# Patient Record
Sex: FEMALE | Race: WHITE | NOT HISPANIC OR LATINO | Employment: OTHER | URBAN - METROPOLITAN AREA
[De-identification: names, ages, dates, MRNs, and addresses within clinical notes are randomized per-mention and may not be internally consistent; named-entity substitution may affect disease eponyms.]

---

## 2022-09-26 ENCOUNTER — APPOINTMENT (OUTPATIENT)
Dept: RADIOLOGY | Facility: MEDICAL CENTER | Age: 57
End: 2022-09-26
Attending: EMERGENCY MEDICINE
Payer: COMMERCIAL

## 2022-09-26 ENCOUNTER — HOSPITAL ENCOUNTER (EMERGENCY)
Facility: MEDICAL CENTER | Age: 57
End: 2022-09-27
Attending: EMERGENCY MEDICINE
Payer: COMMERCIAL

## 2022-09-26 DIAGNOSIS — G89.18 POSTOPERATIVE PAIN: ICD-10-CM

## 2022-09-26 DIAGNOSIS — Z86.73 HISTORY OF RECENT STROKE: ICD-10-CM

## 2022-09-26 DIAGNOSIS — M79.605 LEFT LEG PAIN: ICD-10-CM

## 2022-09-26 DIAGNOSIS — G89.29 OTHER CHRONIC PAIN: ICD-10-CM

## 2022-09-26 DIAGNOSIS — H53.9 VISUAL DISTURBANCE: ICD-10-CM

## 2022-09-26 DIAGNOSIS — Z91.199 HISTORY OF NONCOMPLIANCE WITH MEDICAL TREATMENT: ICD-10-CM

## 2022-09-26 LAB
ALBUMIN SERPL BCP-MCNC: 4.2 G/DL (ref 3.2–4.9)
ALBUMIN/GLOB SERPL: 1.1 G/DL
ALP SERPL-CCNC: 118 U/L (ref 30–99)
ALT SERPL-CCNC: 14 U/L (ref 2–50)
ANION GAP SERPL CALC-SCNC: 14 MMOL/L (ref 7–16)
AST SERPL-CCNC: 16 U/L (ref 12–45)
BASOPHILS # BLD AUTO: 0.3 % (ref 0–1.8)
BASOPHILS # BLD: 0.02 K/UL (ref 0–0.12)
BILIRUB SERPL-MCNC: 0.3 MG/DL (ref 0.1–1.5)
BUN SERPL-MCNC: 8 MG/DL (ref 8–22)
CALCIUM SERPL-MCNC: 9.2 MG/DL (ref 8.5–10.5)
CHLORIDE SERPL-SCNC: 98 MMOL/L (ref 96–112)
CO2 SERPL-SCNC: 22 MMOL/L (ref 20–33)
CREAT SERPL-MCNC: 0.66 MG/DL (ref 0.5–1.4)
EKG IMPRESSION: NORMAL
EOSINOPHIL # BLD AUTO: 0.06 K/UL (ref 0–0.51)
EOSINOPHIL NFR BLD: 0.9 % (ref 0–6.9)
ERYTHROCYTE [DISTWIDTH] IN BLOOD BY AUTOMATED COUNT: 38.6 FL (ref 35.9–50)
GFR SERPLBLD CREATININE-BSD FMLA CKD-EPI: 102 ML/MIN/1.73 M 2
GLOBULIN SER CALC-MCNC: 3.9 G/DL (ref 1.9–3.5)
GLUCOSE SERPL-MCNC: 416 MG/DL (ref 65–99)
HCT VFR BLD AUTO: 37.7 % (ref 37–47)
HGB BLD-MCNC: 12.4 G/DL (ref 12–16)
IMM GRANULOCYTES # BLD AUTO: 0.03 K/UL (ref 0–0.11)
IMM GRANULOCYTES NFR BLD AUTO: 0.4 % (ref 0–0.9)
LYMPHOCYTES # BLD AUTO: 1.49 K/UL (ref 1–4.8)
LYMPHOCYTES NFR BLD: 22.3 % (ref 22–41)
MCH RBC QN AUTO: 28.2 PG (ref 27–33)
MCHC RBC AUTO-ENTMCNC: 32.9 G/DL (ref 33.6–35)
MCV RBC AUTO: 85.9 FL (ref 81.4–97.8)
MONOCYTES # BLD AUTO: 0.4 K/UL (ref 0–0.85)
MONOCYTES NFR BLD AUTO: 6 % (ref 0–13.4)
NEUTROPHILS # BLD AUTO: 4.67 K/UL (ref 2–7.15)
NEUTROPHILS NFR BLD: 70.1 % (ref 44–72)
NRBC # BLD AUTO: 0 K/UL
NRBC BLD-RTO: 0 /100 WBC
PLATELET # BLD AUTO: 270 K/UL (ref 164–446)
PMV BLD AUTO: 11.1 FL (ref 9–12.9)
POTASSIUM SERPL-SCNC: 3.7 MMOL/L (ref 3.6–5.5)
PROT SERPL-MCNC: 8.1 G/DL (ref 6–8.2)
RBC # BLD AUTO: 4.39 M/UL (ref 4.2–5.4)
SODIUM SERPL-SCNC: 134 MMOL/L (ref 135–145)
WBC # BLD AUTO: 6.7 K/UL (ref 4.8–10.8)

## 2022-09-26 PROCEDURE — 700102 HCHG RX REV CODE 250 W/ 637 OVERRIDE(OP): Performed by: EMERGENCY MEDICINE

## 2022-09-26 PROCEDURE — 93005 ELECTROCARDIOGRAM TRACING: CPT | Performed by: EMERGENCY MEDICINE

## 2022-09-26 PROCEDURE — 82962 GLUCOSE BLOOD TEST: CPT

## 2022-09-26 PROCEDURE — 93926 LOWER EXTREMITY STUDY: CPT | Mod: LT

## 2022-09-26 PROCEDURE — A9270 NON-COVERED ITEM OR SERVICE: HCPCS | Performed by: EMERGENCY MEDICINE

## 2022-09-26 PROCEDURE — 93922 UPR/L XTREMITY ART 2 LEVELS: CPT

## 2022-09-26 PROCEDURE — 80053 COMPREHEN METABOLIC PANEL: CPT

## 2022-09-26 PROCEDURE — 99284 EMERGENCY DEPT VISIT MOD MDM: CPT

## 2022-09-26 PROCEDURE — 36415 COLL VENOUS BLD VENIPUNCTURE: CPT

## 2022-09-26 PROCEDURE — 85025 COMPLETE CBC W/AUTO DIFF WBC: CPT

## 2022-09-26 RX ORDER — HYDROCODONE BITARTRATE AND ACETAMINOPHEN 5; 325 MG/1; MG/1
1 TABLET ORAL ONCE
Status: COMPLETED | OUTPATIENT
Start: 2022-09-26 | End: 2022-09-26

## 2022-09-26 RX ORDER — HYDROCODONE BITARTRATE AND ACETAMINOPHEN 5; 325 MG/1; MG/1
1 TABLET ORAL ONCE
Status: COMPLETED | OUTPATIENT
Start: 2022-09-27 | End: 2022-09-26

## 2022-09-26 RX ORDER — CARVEDILOL 6.25 MG/1
12.5 TABLET ORAL ONCE
Status: COMPLETED | OUTPATIENT
Start: 2022-09-27 | End: 2022-09-26

## 2022-09-26 RX ORDER — CLOPIDOGREL BISULFATE 75 MG/1
75 TABLET ORAL ONCE
Status: COMPLETED | OUTPATIENT
Start: 2022-09-26 | End: 2022-09-26

## 2022-09-26 RX ORDER — HYDROCODONE BITARTRATE AND ACETAMINOPHEN 5; 325 MG/1; MG/1
1 TABLET ORAL EVERY 6 HOURS PRN
Qty: 12 TABLET | Refills: 0 | Status: SHIPPED | OUTPATIENT
Start: 2022-09-26 | End: 2022-09-29

## 2022-09-26 RX ADMIN — HYDROCODONE BITARTRATE AND ACETAMINOPHEN 1 TABLET: 5; 325 TABLET ORAL at 17:58

## 2022-09-26 RX ADMIN — CARVEDILOL 12.5 MG: 6.25 TABLET, FILM COATED ORAL at 23:50

## 2022-09-26 RX ADMIN — HYDROCODONE BITARTRATE AND ACETAMINOPHEN 1 TABLET: 5; 325 TABLET ORAL at 23:50

## 2022-09-26 RX ADMIN — CLOPIDOGREL BISULFATE 75 MG: 75 TABLET ORAL at 22:37

## 2022-09-26 NOTE — ED TRIAGE NOTES
Pt amb to triage w/ personal walker.  Chief Complaint   Patient presents with    Vision Change     Rt eye     Pt reports she has not been able to see out of her rt eye since her knee surgery 6d ago.

## 2022-09-27 VITALS
RESPIRATION RATE: 16 BRPM | TEMPERATURE: 98 F | DIASTOLIC BLOOD PRESSURE: 90 MMHG | BODY MASS INDEX: 30.02 KG/M2 | WEIGHT: 163.14 LBS | HEART RATE: 80 BPM | SYSTOLIC BLOOD PRESSURE: 207 MMHG | OXYGEN SATURATION: 95 % | HEIGHT: 62 IN

## 2022-09-27 LAB — GLUCOSE BLD STRIP.AUTO-MCNC: 269 MG/DL (ref 65–99)

## 2022-09-27 NOTE — ED NOTES
Pt is discharged. Paperwork explained and all questions answered. Prescription  education provided.  Narcotics agreement form explained and signed. All belongings sent with pt upon departure. Pt ambulatory with a steady gait out of ED.  Pt calling a taxi for ride home.

## 2022-09-27 NOTE — DISCHARGE INSTRUCTIONS
Call the vascular surgeon Dr. Cruz tomorrow to be seen in the next day or 2.  Return to the emergency department if you have increasing pain, if you have pain in your foot, numbness in your foot or Move your foot or for other concerns.  Your visual problems are from your recent stroke.  It is very important you take your medications including your blood pressure medications and Plavix and cholesterol medications.  Take Norco for pain.  Return for any new medical problems or complaints.  Follow-up with primary care and the vascular surgeon.

## 2022-09-27 NOTE — ED PROVIDER NOTES
"ED Provider Note    Scribed for Rodrick Naqvi M.D. by Laura Sainz. 9/26/2022, 5:39 PM.    Primary care provider: No primary care provider noted.   Means of arrival: Walk-in  History obtained from: Patient   History limited by: None    CHIEF COMPLAINT  Chief Complaint   Patient presents with    Vision Change     Rt eye       HPI  Jessica Schneider is a 57 y.o. female who presents to the Emergency Department for evaluation of left knee pain onset 6 days ago. Patient states that she had left leg surgery 6 days ago to \"remove a vein\". She states that \"some crazy doc did it\" at [place]. She notes that she did not receive any pain medication upon discharge and was told to come [facility] some. Patient also reports vision changes since her surgery. She reports that she is unable to see towards the right side out of both eyes. She adds a history of diabetes and hypertension. She admits to associated symptoms of left leg pain, and bilateral blurry vision (more on right), but denies eye pain, head trauma, or neck pain. No alleviating factors were reported. Patient reports she does not smoke and is a recovering alcoholic.  Denies any falls or trauma.  Denies any other acute concerns or complaints.  No other focal numbness tingling or weakness.  The patient also states that her chronic pain doctor will not refill her prescription for Norco.  She has been on Norco for 2 years for pain.  She has mostly in her back but it is all over her body.  She is very upset about this.  She has not taken her medicine that she was prescribed during her recent hospitalization.  Cannot tell me any details about the name of the hospital, location of the hospital or the doctors what her diagnosis occasions.    REVIEW OF SYSTEMS  ROS  See HPI above. All other systems negative.     PAST MEDICAL HISTORY   has a past medical history of Hypertension.    SURGICAL HISTORY   has a past surgical history that includes other orthopedic " "surgery.    SOCIAL HISTORY  Social History     Tobacco Use    Smoking status: Former     Types: Cigarettes   Vaping Use    Vaping Use: Never used   Substance Use Topics    Alcohol use: Not Currently    Drug use: Not Currently      Social History     Substance and Sexual Activity   Drug Use Not Currently       FAMILY HISTORY  History reviewed. No pertinent family history.    CURRENT MEDICATIONS  Home Medications       Reviewed by Benson Schwartz R.N. (Registered Nurse) on 09/26/22 at 1404  Med List Status: Partial     Medication Last Dose Status   HYDROCODONE-ACETAMINOPHEN PO  Active   NON SPECIFIED  Active                    ALLERGIES  Allergies   Allergen Reactions    Aspirin      Rxn=seizure       PHYSICAL EXAM  VITAL SIGNS: BP (!) 200/99   Pulse 97   Temp 36.3 °C (97.3 °F) (Oral)   Resp 18   Ht 1.575 m (5' 2\")   Wt 74 kg (163 lb 2.3 oz)   SpO2 100%   BMI 29.84 kg/m²   Vitals reviewed.  Constitutional: Well developed, Well nourished, No acute distress, Non-toxic appearance.  Tearful complaining of leg pain.  HENT: Normocephalic, Atraumatic, Bilateral external ears normal, Oropharynx moist, No oral exudates, Nose normal.   Eyes: PERRL, EOMI, Conjunctiva normal, No discharge.  She has a right lateral home homonymous hemianopsia  Neck: Normal range of motion, No tenderness, Supple, No stridor.   Cardiovascular: Normal heart rate, Normal rhythm, No murmurs, No rubs, No gallops.   Thorax & Lungs: Normal breath sounds, No respiratory distress, No wheezing, No chest tenderness.   Abdomen: Bowel sounds normal, Soft, No tenderness  Skin: Warm, Dry, No erythema, No rash.   Back: No tenderness, No CVA tenderness.   Musculoskeletal: Good range of motion in all major joints.  Left leg has a surgical incision below the knee on the medial aspect with sutures intact.  Normal neurovascular exam.  Neurologic: Cranial nerves II through XII are intact other than her visual field deficit above.  Normal finger-to-nose.  No " focal weakness.  Psychiatric: Affect normal    LABS  Results for orders placed or performed during the hospital encounter of 09/26/22   CBC WITH DIFFERENTIAL   Result Value Ref Range    WBC 6.7 4.8 - 10.8 K/uL    RBC 4.39 4.20 - 5.40 M/uL    Hemoglobin 12.4 12.0 - 16.0 g/dL    Hematocrit 37.7 37.0 - 47.0 %    MCV 85.9 81.4 - 97.8 fL    MCH 28.2 27.0 - 33.0 pg    MCHC 32.9 (L) 33.6 - 35.0 g/dL    RDW 38.6 35.9 - 50.0 fL    Platelet Count 270 164 - 446 K/uL    MPV 11.1 9.0 - 12.9 fL    Neutrophils-Polys 70.10 44.00 - 72.00 %    Lymphocytes 22.30 22.00 - 41.00 %    Monocytes 6.00 0.00 - 13.40 %    Eosinophils 0.90 0.00 - 6.90 %    Basophils 0.30 0.00 - 1.80 %    Immature Granulocytes 0.40 0.00 - 0.90 %    Nucleated RBC 0.00 /100 WBC    Neutrophils (Absolute) 4.67 2.00 - 7.15 K/uL    Lymphs (Absolute) 1.49 1.00 - 4.80 K/uL    Monos (Absolute) 0.40 0.00 - 0.85 K/uL    Eos (Absolute) 0.06 0.00 - 0.51 K/uL    Baso (Absolute) 0.02 0.00 - 0.12 K/uL    Immature Granulocytes (abs) 0.03 0.00 - 0.11 K/uL    NRBC (Absolute) 0.00 K/uL   COMP METABOLIC PANEL   Result Value Ref Range    Sodium 134 (L) 135 - 145 mmol/L    Potassium 3.7 3.6 - 5.5 mmol/L    Chloride 98 96 - 112 mmol/L    Co2 22 20 - 33 mmol/L    Anion Gap 14.0 7.0 - 16.0    Glucose 416 (H) 65 - 99 mg/dL    Bun 8 8 - 22 mg/dL    Creatinine 0.66 0.50 - 1.40 mg/dL    Calcium 9.2 8.5 - 10.5 mg/dL    AST(SGOT) 16 12 - 45 U/L    ALT(SGPT) 14 2 - 50 U/L    Alkaline Phosphatase 118 (H) 30 - 99 U/L    Total Bilirubin 0.3 0.1 - 1.5 mg/dL    Albumin 4.2 3.2 - 4.9 g/dL    Total Protein 8.1 6.0 - 8.2 g/dL    Globulin 3.9 (H) 1.9 - 3.5 g/dL    A-G Ratio 1.1 g/dL   ESTIMATED GFR   Result Value Ref Range    GFR (CKD-EPI) 102 >60 mL/min/1.73 m 2   EKG (NOW)   Result Value Ref Range    Report       [facility] Emergency Dept.    Test Date:  2022-09-26  Pt Name:    ORTIZ MURRAY                 Department: ER  MRN:        [id number]                      Room:         65  Gender:     Female                       Technician: [id number]  :        1965                   Requested By:KIMBERLEY CHILD  Order #:    [id number]                    Reading MD: KIMBERLEY CHILD. AMD    Measurements  Intervals                                Axis  Rate:       93                           P:          74  NE:         180                          QRS:        85  QRSD:       71                           T:          57  QT:         364  QTc:        453    Interpretive Statements  Sinus rhythm  No previous ECG available for comparison  Electronically Signed On 2022 23:57:09 PDT by KIMBERLEY CIHLD. AMD       All labs reviewed by me.    EKG      RADIOLOGY  US-EXTREMITY ARTERY LOWER UNILAT LEFT         US-JENNIFER SINGLE LEVEL BILAT           The radiologist's interpretation of all radiological studies have been reviewed by me.    COURSE & MEDICAL DECISION MAKING  Pertinent Labs & Imaging studies reviewed. (See chart for details)    Obtained and reviewed past medical records from [facility] once we receive them several hours into her care.  Records were not available on care everywhere there is no records in our chart system in epic.    5:39 PM Patient seen and examined at bedside. The patient presents with visual disturbance.  She does complain of her chronic pain and some left leg pain postoperatively since her surgery.  Main concern is vision loss from her eyes.  She reports this being subacute.  Her eyes are normal.  On exam she has a homonymous hemianopsia and no vision on the right side.  The remainder of her neurologic exam is fairly normal.  I am concerned about a perioperative stroke or dissection and I think she needs a neurologic work-up.  Ordered for CT CTA head w/ and w/o, CT CTA neck w/ and w/o, CBC w/diff, and CMP to evaluate. Patient will be treated with Norco for her symptoms. Discussed plan of care with patient. I informed them that labs and imaging will be ordered to  evaluate symptoms. Patient is understanding and agreeable with plan.     Ultimately received the records in the form of a fax.  Time of the fax is around 1900.  I will asked the  to scan these records into the media.  This was a discharge summary from [place].  She was found to have a perioperative stroke and the visual deficits that she describes now were documented while she was in the hospital.  This is both clinically and the notes as well as on MRI.  This is being treated with Plavix and a statin which she is currently not taking.  She has no acute worsening or progression of her neurologic process.  I called CT and canceled the CTAs of her head and neck because I do not think she requires further imaging.    The patient does also complain of left leg pain.  While here this seemed to gradually, more and more concern for her.  She does have incision in the medial aspect of her thigh which is clean dry and intact.  She has a groin incision which is also clean dry and intact.  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    I can feel pulses were faintly in her right leg.  I have a difficult time appreciating posterior tibialis on the left I believe I feel a dorsal pedis on the left.  Her foot is warm and well-perfused, it is sensate and has normal movement.  Cap refill is immediate.  Clinically she does not have limb ischemia.  I cannot exclude graft ischemia or complication considering her recent surgery especially in light of her noncompliance.    At this time I called Dr. Rivera call for   Vascular surgery.  He wanted an JENNIFER and arterial study with ultrasound of the leg.  This was ordered and performed.  The tech was unable to perform an JENNIFER.  She was identified monophasic flow in the dorsal pedis but no flow in the posterior tibialis.  She is discussed her results directly with Dr. Romero which over the phone.  She retrieved a second machine and did it  focal ultrasound looking for an acute thrombus.  No acute thrombus was identified.  She does express concern identify abnormal flow around the area of the graft.  No obvious pseudoaneurysm or hemorrhage is noted.    Once a second ultrasound was resulted around 11:20 PM I again spoke with Dr. Peralta which reviewing these results.  He felt again without perfused foot that the patient can be managed as an outpatient follow-up with Dr. Cruz.    The patient is reassessed just before midnight.  Her blood pressure is high however she denies any other complaints of pain.  She is still has a warm and well-perfused foot.  Denies any complaints of pain in her foot.  She states her back hurts in the area of her chronic pain but it is minimal.  She would like her 6-hour Norco dose which I have given her.  She states her foot feels fine to be having some postoperative pain with the incision but is not acutely worse and she states she is comfortable going home.    She was prescribed multiple medications at the other hospital.  I have given her dose of her Plavix and carvedilol.  She has not been on antiplatelet agent or antihypertensive.  There is dose here in the emergency department.  She states her son is part of her prescription.    She states she is out of her Norco.  PDMP is reviewed.  She has no active pain prescription I will send a prescription to her pharmacy.      The patient will follow up with Dr. Cruz.  I gave her the name and number and she is to call in the morning.    She was not prescribed narcotic fell from discharge but she is advised to continue insulin.    Her blood sugar came down nicely.  Despite being elevated she is no signs of DKA.  She would not be given IV narcotics.  Suggested follow-up with primary care doctor.  She is given close return precautions and discharge instructions.  Questions are answered she is agreeable to plan.    Discharged in good condition.    Stephane Walter M.D.  [address] [facility]  [place] [address]  [phone]    Schedule an appointment as soon as possible for a visit in 1 day      In prescribing controlled substances to this patient, I certify that I have obtained and reviewed the medical history of Jessica Schneider. I have also made a good lesia effort to obtain applicable records from other providers who have treated the patient and no other records are available at this time.     I have conducted a physical exam and documented it. I have reviewed Ms. Schneider’s prescription history as maintained by the [place] Prescription Monitoring Program.     I have assessed the patient’s risk for abuse, dependency, and addiction using the validated Opioid Risk Tool available at https://www.mdcOLSET.com/ogqoim-xejn-rbbo-ort-narcotic-abuse.     Given the above, I believe the benefits of controlled substance therapy outweigh the risks. The reasons for prescribing controlled substances include non-narcotic, oral analgesic alternatives have been inadequate for pain control. Accordingly, I have discussed the risk and benefits, treatment plan, and alternative therapies with the patient.           FINAL IMPRESSION  1. Visual disturbance    2. History of recent stroke    3. Other chronic pain    4. Left leg pain    5. Postoperative pain    6. History of noncompliance with medical treatment          Laura CUEVA (Nichelleibfrandy), am scribing for, and in the presence of, Rodrick Naqvi M.D..    Electronically signed by: Laura Sainz (Charlie), 9/26/2022    Rodrick CUEVA M.D. personally performed the services described in this documentation, as scribed by Laura Sainz in my presence, and it is both accurate and complete. C.     The note accurately reflects work and decisions made by me.  Rodrick Naqvi M.D.  9/27/2022  12:11 AM

## 2023-05-31 PROBLEM — T82.898A FEMORAL-POPLITEAL BYPASS GRAFT OCCLUSION (HCC): Status: ACTIVE | Noted: 2022-12-14

## 2023-05-31 PROBLEM — D69.6 THROMBOCYTOPENIA (HCC): Status: ACTIVE | Noted: 2022-09-13

## 2023-05-31 PROBLEM — I70.90 ARTERIAL OCCLUSION: Status: ACTIVE | Noted: 2022-09-12

## 2023-05-31 PROBLEM — I10 ESSENTIAL HYPERTENSION: Chronic | Status: ACTIVE | Noted: 2023-02-22

## 2023-05-31 PROBLEM — M79.7 FIBROMYALGIA: Chronic | Status: ACTIVE | Noted: 2023-05-31

## 2023-05-31 PROBLEM — F32.1 CURRENT MODERATE EPISODE OF MAJOR DEPRESSIVE DISORDER (HCC): Status: RESOLVED | Noted: 2022-12-15 | Resolved: 2023-05-31

## 2023-05-31 PROBLEM — E11.621 ISCHEMIC ULCER DIABETIC FOOT (HCC): Status: ACTIVE | Noted: 2023-02-20

## 2023-05-31 PROBLEM — M79.7 FIBROMYALGIA: Status: ACTIVE | Noted: 2023-05-31

## 2023-05-31 PROBLEM — I73.9 PVD (PERIPHERAL VASCULAR DISEASE) WITH CLAUDICATION (HCC): Chronic | Status: ACTIVE | Noted: 2023-02-20

## 2023-05-31 PROBLEM — F11.288 OPIOID DEPENDENCE WITH OTHER OPIOID-INDUCED DISORDER (HCC): Status: ACTIVE | Noted: 2022-12-16

## 2023-05-31 PROBLEM — D69.6 THROMBOCYTOPENIA (HCC): Chronic | Status: ACTIVE | Noted: 2022-09-13

## 2023-05-31 PROBLEM — I73.9 PVD (PERIPHERAL VASCULAR DISEASE) WITH CLAUDICATION (HCC): Status: ACTIVE | Noted: 2023-02-20

## 2023-05-31 PROBLEM — E11.51 DM TYPE 2 CAUSING VASCULAR DISEASE, NOT AT GOAL (HCC): Chronic | Status: ACTIVE | Noted: 2023-02-20

## 2023-05-31 PROBLEM — L97.509 ISCHEMIC ULCER DIABETIC FOOT (HCC): Chronic | Status: ACTIVE | Noted: 2023-02-20

## 2023-05-31 PROBLEM — I99.8 LOWER LIMB ISCHEMIA: Status: ACTIVE | Noted: 2022-06-15

## 2023-05-31 PROBLEM — Z89.411 ACQUIRED ABSENCE OF RIGHT GREAT TOE (HCC): Status: ACTIVE | Noted: 2023-05-31

## 2023-05-31 PROBLEM — F32.1 CURRENT MODERATE EPISODE OF MAJOR DEPRESSIVE DISORDER (HCC): Status: ACTIVE | Noted: 2022-12-15

## 2023-05-31 PROBLEM — I65.9: Status: ACTIVE | Noted: 2020-09-09

## 2023-05-31 PROBLEM — L97.509 ISCHEMIC ULCER DIABETIC FOOT (HCC): Status: ACTIVE | Noted: 2023-02-20

## 2023-05-31 PROBLEM — Z89.619 S/P AKA (ABOVE KNEE AMPUTATION) (HCC): Chronic | Status: ACTIVE | Noted: 2023-05-31

## 2023-05-31 PROBLEM — F11.288 OPIOID DEPENDENCE WITH OTHER OPIOID-INDUCED DISORDER (HCC): Chronic | Status: ACTIVE | Noted: 2022-12-16

## 2023-05-31 PROBLEM — Z89.411 ACQUIRED ABSENCE OF RIGHT GREAT TOE (HCC): Chronic | Status: ACTIVE | Noted: 2023-05-31

## 2023-05-31 PROBLEM — T82.898A FEMORAL-POPLITEAL BYPASS GRAFT OCCLUSION (HCC): Chronic | Status: ACTIVE | Noted: 2022-12-14

## 2023-05-31 PROBLEM — I10 ESSENTIAL HYPERTENSION: Status: ACTIVE | Noted: 2023-02-22

## 2023-05-31 PROBLEM — E11.51 DM TYPE 2 CAUSING VASCULAR DISEASE, NOT AT GOAL (HCC): Status: ACTIVE | Noted: 2023-02-20

## 2023-05-31 PROBLEM — Z74.09 IMPAIRED MOBILITY: Status: ACTIVE | Noted: 2023-05-31

## 2023-05-31 PROBLEM — E11.621 ISCHEMIC ULCER DIABETIC FOOT (HCC): Chronic | Status: ACTIVE | Noted: 2023-02-20

## 2023-05-31 PROBLEM — Z89.619 S/P AKA (ABOVE KNEE AMPUTATION) (HCC): Status: ACTIVE | Noted: 2023-05-31

## 2023-06-02 PROBLEM — Z74.09 IMPAIRED MOBILITY: Chronic | Status: ACTIVE | Noted: 2023-05-31

## 2023-06-02 PROBLEM — I99.8 LOWER LIMB ISCHEMIA: Status: RESOLVED | Noted: 2022-06-15 | Resolved: 2023-06-02

## 2023-06-02 PROBLEM — G54.6 PHANTOM PAIN AFTER AMPUTATION OF LOWER EXTREMITY (HCC): Status: ACTIVE | Noted: 2023-06-02

## 2023-06-02 PROBLEM — D69.6 THROMBOCYTOPENIA (HCC): Chronic | Status: RESOLVED | Noted: 2022-09-13 | Resolved: 2023-06-02

## 2023-06-02 PROBLEM — I70.90 ARTERIAL OCCLUSION: Status: RESOLVED | Noted: 2022-09-12 | Resolved: 2023-06-02

## 2023-06-02 PROBLEM — G54.6 PHANTOM PAIN AFTER AMPUTATION OF LOWER EXTREMITY (HCC): Chronic | Status: ACTIVE | Noted: 2023-06-02

## 2023-07-03 PROBLEM — E11.42 DIABETIC POLYNEUROPATHY ASSOCIATED WITH TYPE 2 DIABETES MELLITUS (HCC): Status: ACTIVE | Noted: 2023-07-03

## 2023-07-03 PROBLEM — Z71.89 COUNSELING AND COORDINATION OF CARE: Status: ACTIVE | Noted: 2023-07-03

## 2023-07-14 PROBLEM — G43.909 MIGRAINE WITHOUT STATUS MIGRAINOSUS, NOT INTRACTABLE: Status: ACTIVE | Noted: 2023-07-14

## 2023-07-14 PROBLEM — B37.31 VAGINAL CANDIDIASIS: Status: ACTIVE | Noted: 2023-07-14

## 2023-07-14 PROBLEM — R25.9 INVOLUNTARY MOVEMENTS: Status: ACTIVE | Noted: 2023-07-14
